# Patient Record
(demographics unavailable — no encounter records)

---

## 2024-10-10 NOTE — DISCUSSION/SUMMARY
[de-identified] : General Dx Discussion The patient was advised of the diagnosis. The natural history of the pathology was explained in full to the patient in layman's terms. All questions were answered. The risks and benefits of surgical and non-surgical treatment alternatives were explained in full to the patient.  Case discussed. Orthovisc tolerated well. Ice as needed. Follow up in 1 week to continue the series.   Entered by EVELIN Horton acting as scribe. - The documentation recorded by the scribe accurately reflects the service I personally performed and the decisions made by me.

## 2024-10-10 NOTE — HISTORY OF PRESENT ILLNESS
[3] : 3 [Orthovisc] : Orthovisc [de-identified] : Patient is here for a Orthrovisc injection #3 for NANDO knees [] : no [de-identified] : Víctor Knees

## 2024-10-10 NOTE — PROCEDURE
[Large Joint Injection] : Large joint injection [Bilateral] : bilaterally of the [Knee] : knee [Pain] : pain [X-ray evidence of Osteoarthritis on this or prior visit] : x-ray evidence of Osteoarthritis on this or prior visit [Alcohol] : alcohol [Betadine] : betadine [Ethyl Chloride sprayed topically] : ethyl chloride sprayed topically [Sterile technique used] : sterile technique used [Orthovisc (30mg)] : 30mg of Orthovisc [#3] : series #3 [] : Patient tolerated procedure well [Call if redness, pain or fever occur] : call if redness, pain or fever occur [Apply ice for 15min out of every hour for the next 12-24 hours as tolerated] : apply ice for 15 minutes out of every hour for the next 12-24 hours as tolerated [Previous OTC use and PT nontherapeutic] : patient has tried OTC's including aspirin, Ibuprofen, Aleve, etc or prescription NSAIDS, and/or exercises at home and/or physical therapy without satisfactory response [Patient had decreased mobility in the joint] : patient had decreased mobility in the joint [Risks, benefits, alternatives discussed / Verbal consent obtained] : the risks benefits, and alternatives have been discussed, and verbal consent was obtained [Prior failure or difficult injection] : prior failure or difficult injection [Altered anatomic landmarks d/t erosive arthritis] : altered anatomic landmarks d/t erosive arthritis [All ultrasound images have been permanently captured and stored accordingly in our picture archiving and communication system] : All ultrasound images have been permanently captured and stored accordingly in our picture archiving and communication system [Visualization of the needle and placement of injection was performed without complication] : visualization of the needle and placement of injection was performed without complication

## 2024-10-10 NOTE — PHYSICAL EXAM
[Bilateral] : knee bilaterally [Negative] : negative Nancy's [] : uses walker [TWNoteComboBox7] : flexion 105 degrees [de-identified] : extension 0 degrees

## 2024-10-10 NOTE — DISCUSSION/SUMMARY
[de-identified] : General Dx Discussion The patient was advised of the diagnosis. The natural history of the pathology was explained in full to the patient in layman's terms. All questions were answered. The risks and benefits of surgical and non-surgical treatment alternatives were explained in full to the patient.  Case discussed. Orthovisc tolerated well. Ice as needed. Follow up in 1 week to continue the series.   Entered by EVELIN Horton acting as scribe. - The documentation recorded by the scribe accurately reflects the service I personally performed and the decisions made by me.

## 2024-10-10 NOTE — HISTORY OF PRESENT ILLNESS
[3] : 3 [Orthovisc] : Orthovisc [de-identified] : Patient is here for a Orthrovisc injection #3 for NANDO knees [] : no [de-identified] : Víctor Knees

## 2024-10-10 NOTE — PHYSICAL EXAM
[Bilateral] : knee bilaterally [Negative] : negative Nancy's [] : uses walker [TWNoteComboBox7] : flexion 105 degrees [de-identified] : extension 0 degrees

## 2024-10-18 NOTE — DISCUSSION/SUMMARY
[de-identified] : General Dx Discussion The patient was advised of the diagnosis. The natural history of the pathology was explained in full to the patient in layman's terms. All questions were answered. The risks and benefits of surgical and non-surgical treatment alternatives were explained in full to the patient.  Case discussed. Orthovisc tolerated well. Ice as needed. Follow up in 6 weeks if symptoms persist.   Entered by EVELIN Perkins acting as scribe. - The documentation recorded by the scribe accurately reflects the service I personally performed and the decisions made by me.

## 2024-10-18 NOTE — HISTORY OF PRESENT ILLNESS
[4] : 4 [Orthovisc] : Orthovisc [de-identified] : Patient is here for a Orthrovisc injection #4 for NANDO knees [] : no [de-identified] : Víctor Knees [TWNoteComboBox1] : 30%

## 2024-10-18 NOTE — PROCEDURE
[Large Joint Injection] : Large joint injection [Bilateral] : bilaterally of the [Knee] : knee [Pain] : pain [X-ray evidence of Osteoarthritis on this or prior visit] : x-ray evidence of Osteoarthritis on this or prior visit [Alcohol] : alcohol [Betadine] : betadine [Ethyl Chloride sprayed topically] : ethyl chloride sprayed topically [Sterile technique used] : sterile technique used [Orthovisc (30mg)] : 30mg of Orthovisc [#4] : series #4 [] : Patient tolerated procedure well [Call if redness, pain or fever occur] : call if redness, pain or fever occur [Apply ice for 15min out of every hour for the next 12-24 hours as tolerated] : apply ice for 15 minutes out of every hour for the next 12-24 hours as tolerated [Previous OTC use and PT nontherapeutic] : patient has tried OTC's including aspirin, Ibuprofen, Aleve, etc or prescription NSAIDS, and/or exercises at home and/or physical therapy without satisfactory response [Patient had decreased mobility in the joint] : patient had decreased mobility in the joint [Risks, benefits, alternatives discussed / Verbal consent obtained] : the risks benefits, and alternatives have been discussed, and verbal consent was obtained [Prior failure or difficult injection] : prior failure or difficult injection [Altered anatomic landmarks d/t erosive arthritis] : altered anatomic landmarks d/t erosive arthritis [All ultrasound images have been permanently captured and stored accordingly in our picture archiving and communication system] : All ultrasound images have been permanently captured and stored accordingly in our picture archiving and communication system [Visualization of the needle and placement of injection was performed without complication] : visualization of the needle and placement of injection was performed without complication

## 2024-10-18 NOTE — PHYSICAL EXAM
[Bilateral] : knee bilaterally [Negative] : negative Nancy's [] : uses walker [TWNoteComboBox7] : flexion 105 degrees [de-identified] : extension 0 degrees

## 2024-10-25 NOTE — PROCEDURE
[FreeTextEntry3] : Tendon sheath injection r4 a1 was performed. The indication for this procedure was pain and inflammation. The site was prepped with alcohol, betadine, ethyl chloride sprayed topically and sterile technique used. An injection of Betamethasone (Celestone) 1cc of 3mg, Lidocaine 1cc of 1%  was used.  Patient tolerated procedure well.   The risks benefits, and alternatives have been discussed, and verbal consent was obtained.  Ultrasound guidance was indicated for this patient due to prior failure or difficult injection. All ultrasound images have been permanently captured and stored accordingly in our picture archiving and communication system. Visualization of the needle and placement of injection was performed without complication.

## 2024-10-25 NOTE — HISTORY OF PRESENT ILLNESS
[Rest] : rest [Gradual] : gradual [7] : 7 [5] : 5 [Dull/Aching] : dull/aching [Intermittent] : intermittent [Meds] : meds [] : no [FreeTextEntry1] : RT Hand/Wrist  [FreeTextEntry5] : Patient has history of trigger finger and is now starting to have pain throughout hand into wrist.  10/25 pain and clicking r4 [FreeTextEntry7] : shoulder  [FreeTextEntry9] : advil and brace  [de-identified] : moving hand

## 2024-10-25 NOTE — PHYSICAL EXAM
[4th] : 4th [A1-Pulley] : A1-pulley [Right] : right hand [Degenerative change] : Degenerative change

## 2025-04-24 NOTE — HISTORY OF PRESENT ILLNESS
[4] : 4 [Orthovisc] : Orthovisc [] : yes [8] : 8 [Dull/Aching] : dull/aching [Sharp] : sharp [Intermittent] : intermittent [Walking] : walking

## 2025-05-08 NOTE — PROCEDURE
[Large Joint Injection] : Large joint injection [Bilateral] : bilaterally of the [Knee] : knee [Pain] : pain [Inflammation] : inflammation [X-ray evidence of Osteoarthritis on this or prior visit] : x-ray evidence of Osteoarthritis on this or prior visit [Repeat series performed] : repeat series performed [Alcohol] : alcohol [Betadine] : betadine [Ethyl Chloride sprayed topically] : ethyl chloride sprayed topically [Sterile technique used] : sterile technique used [Orthovisc (30mg)] : 30mg of Orthovisc [#2] : series #2 [] : Patient tolerated procedure well [Call if redness, pain or fever occur] : call if redness, pain or fever occur [Apply ice for 15min out of every hour for the next 12-24 hours as tolerated] : apply ice for 15 minutes out of every hour for the next 12-24 hours as tolerated [Previous OTC use and PT nontherapeutic] : patient has tried OTC's including aspirin, Ibuprofen, Aleve, etc or prescription NSAIDS, and/or exercises at home and/or physical therapy without satisfactory response [Patient had decreased mobility in the joint] : patient had decreased mobility in the joint [Risks, benefits, alternatives discussed / Verbal consent obtained] : the risks benefits, and alternatives have been discussed, and verbal consent was obtained [Prior failure or difficult injection] : prior failure or difficult injection [Altered anatomic landmarks d/t erosive arthritis] : altered anatomic landmarks d/t erosive arthritis [All ultrasound images have been permanently captured and stored accordingly in our picture archiving and communication system] : All ultrasound images have been permanently captured and stored accordingly in our picture archiving and communication system

## 2025-05-09 NOTE — PHYSICAL EXAM
[Bilateral] : knee bilaterally [Negative] : negative Nancy's [] : no pain with varus stress [TWNoteComboBox7] : flexion 105 degrees [de-identified] : extension 0 degrees

## 2025-05-09 NOTE — DISCUSSION/SUMMARY
[de-identified] : General Dx Discussion The patient was advised of the diagnosis. The natural history of the pathology was explained in full to the patient in layman's terms. All questions were answered. The risks and benefits of surgical and non-surgical treatment alternatives were explained in full to the patient.  Case discussed. Orthovisc tolerated well. Ice as needed. Will start lidoderm patches as needed. Follow up in 1 week to continue the series.    Entered by EVELIN Polo acting as scribe. - The documentation recorded by the scribe accurately reflects the service I personally performed and the decisions made by me.

## 2025-05-09 NOTE — HISTORY OF PRESENT ILLNESS
[8] : 8 [Dull/Aching] : dull/aching [Sharp] : sharp [Intermittent] : intermittent [Walking] : walking [2] : 2 [Orthovisc] : Orthovisc [de-identified] : 05/08/2025: Patient is here for  Knee Orthovisc #2. [] : no [FreeTextEntry1] : BL knees [de-identified] : Víctor Knees [TWNoteComboBox1] : 30%

## 2025-05-15 NOTE — PROCEDURE
[Large Joint Injection] : Large joint injection [Bilateral] : bilaterally of the [Knee] : knee [Pain] : pain [Inflammation] : inflammation [X-ray evidence of Osteoarthritis on this or prior visit] : x-ray evidence of Osteoarthritis on this or prior visit [Repeat series performed] : repeat series performed [Alcohol] : alcohol [Betadine] : betadine [Ethyl Chloride sprayed topically] : ethyl chloride sprayed topically [Sterile technique used] : sterile technique used [Orthovisc (30mg)] : 30mg of Orthovisc [] : Patient tolerated procedure well [Call if redness, pain or fever occur] : call if redness, pain or fever occur [Apply ice for 15min out of every hour for the next 12-24 hours as tolerated] : apply ice for 15 minutes out of every hour for the next 12-24 hours as tolerated [Previous OTC use and PT nontherapeutic] : patient has tried OTC's including aspirin, Ibuprofen, Aleve, etc or prescription NSAIDS, and/or exercises at home and/or physical therapy without satisfactory response [Patient had decreased mobility in the joint] : patient had decreased mobility in the joint [Risks, benefits, alternatives discussed / Verbal consent obtained] : the risks benefits, and alternatives have been discussed, and verbal consent was obtained [Prior failure or difficult injection] : prior failure or difficult injection [Altered anatomic landmarks d/t erosive arthritis] : altered anatomic landmarks d/t erosive arthritis [All ultrasound images have been permanently captured and stored accordingly in our picture archiving and communication system] : All ultrasound images have been permanently captured and stored accordingly in our picture archiving and communication system [#3] : series #3

## 2025-05-15 NOTE — HISTORY OF PRESENT ILLNESS
----- Message from Mario Logan MD sent at 3/2/2020 12:33 PM CST -----  Please call Mom, CBC is normal. Will have them call us if not better in a few weeks   [8] : 8 [Dull/Aching] : dull/aching [Sharp] : sharp [Intermittent] : intermittent [Walking] : walking [3] : 3 [Orthovisc] : Orthovisc [] : yes

## 2025-05-22 NOTE — PROCEDURE
[Large Joint Injection] : Large joint injection [Bilateral] : bilaterally of the [Knee] : knee [Pain] : pain [Inflammation] : inflammation [X-ray evidence of Osteoarthritis on this or prior visit] : x-ray evidence of Osteoarthritis on this or prior visit [Repeat series performed] : repeat series performed [Alcohol] : alcohol [Betadine] : betadine [Ethyl Chloride sprayed topically] : ethyl chloride sprayed topically [Sterile technique used] : sterile technique used [Orthovisc (30mg)] : 30mg of Orthovisc [#4] : series #4 [] : Patient tolerated procedure well [Call if redness, pain or fever occur] : call if redness, pain or fever occur [Apply ice for 15min out of every hour for the next 12-24 hours as tolerated] : apply ice for 15 minutes out of every hour for the next 12-24 hours as tolerated [Previous OTC use and PT nontherapeutic] : patient has tried OTC's including aspirin, Ibuprofen, Aleve, etc or prescription NSAIDS, and/or exercises at home and/or physical therapy without satisfactory response [Patient had decreased mobility in the joint] : patient had decreased mobility in the joint [Risks, benefits, alternatives discussed / Verbal consent obtained] : the risks benefits, and alternatives have been discussed, and verbal consent was obtained [Prior failure or difficult injection] : prior failure or difficult injection [Altered anatomic landmarks d/t erosive arthritis] : altered anatomic landmarks d/t erosive arthritis [All ultrasound images have been permanently captured and stored accordingly in our picture archiving and communication system] : All ultrasound images have been permanently captured and stored accordingly in our picture archiving and communication system

## 2025-05-22 NOTE — PHYSICAL EXAM
[Bilateral] : knee bilaterally [Negative] : negative Nancy's [] : no pain with varus stress [TWNoteComboBox7] : flexion 105 degrees [de-identified] : extension 0 degrees

## 2025-05-22 NOTE — HISTORY OF PRESENT ILLNESS
[8] : 8 [Dull/Aching] : dull/aching [Sharp] : sharp [Intermittent] : intermittent [Walking] : walking [4] : 4 [Orthovisc] : Orthovisc [de-identified] : Patient is here for a follow up on both knees. [] : no [FreeTextEntry1] : BL knees [de-identified] : 5/15/25 [de-identified] : Víctor Knees [TWNoteComboBox1] : 30%

## 2025-05-22 NOTE — DISCUSSION/SUMMARY
[de-identified] : General Dx Discussion The patient was advised of the diagnosis. The natural history of the pathology was explained in full to the patient in layman's terms. All questions were answered. The risks and benefits of surgical and non-surgical treatment alternatives were explained in full to the patient.  Case Discussed. Orthovisc tolerated well. Ice as needed. Follow up as needed.    Entered by EVELIN Polo acting as scribe. - The documentation recorded by the scribe accurately reflects the service I personally performed and the decisions made by me.

## 2025-07-03 NOTE — PROCEDURE
[FreeTextEntry3] : Date of Service: 07/03/2025   Account: [id number]   Patient: ANGELA LEONARDO   YOB: 1939   Age: 85 year     Surgeon: Cari Orr M.D.   Pre-Operative Diagnosis: Right Knee pain   Post Operative Diagnosis: Right Knee pain   Procedure: Right Genicular nerve block under fluoroscopic guidance   Anesthesia: None     Risks, benefits and alternatives of the procedure were discussed with the patient after which they agreed to proceed. Patient was brought into fluoroscopy suite and was placed in supine position, the Right knee was then placed on pillows as tolerated to offset a clear lateral view. The knee was prepped and draped in a sterile fashion.   Under AP visualization, the superior medial and lateral epicondyle of the femur as well as the distal aspect of the medial tibial epicondyle was identified. Using a 25 gauge  inch needle the skin and subcutaneous structures at this point were localized with 1% Lidocaine using approximately 3 cc's 1% Lidocaine. After this, a 25 gauge needle was inserted under constant fluoroscopic visualization. The needles were advanced using fluoroscopic guidance until reaching the mid level of the femur and tibia confirmed with a lateral view. A mixture of 1cc of 0.25% marcaine and depoomedrol was injected at all 3 sites.   Pillsbury were removed and pressure was then applied. Band-aids were applied. Patient was brought to the recovery, ambulated on their own after the procedure.   Patient was told to apply ice to the knee for 20 minutes on and 20 minutes off for focal symptoms for 24-48 hours. Patient is to call the office if they had any questions or concerns.   Cari Orr M.D.

## 2025-07-12 NOTE — REASON FOR VISIT
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  She was able to sleep.  There is a history of gastritis, so she hasn't taken any NSAIDs.  No n/t.

## 2025-07-12 NOTE — ASSESSMENT
[FreeTextEntry1] : _____ We reviewed the findings and her history. This is an acute, uncomplicated problem. A thumb spica brace was fitted. There are GI issues, so Arthrotec 50mg 1 po q12h PRN x 7 days planned, with GI precautions. She will see Dr. Guido for revaluation.  Patient seen by Kimmy BARRON who determined the assessment and plan and participated in all aspects of the office encounter.

## 2025-07-12 NOTE — HISTORY OF PRESENT ILLNESS
[8] : 8 [Sharp] : sharp [Intermittent] : intermittent [Retired] : Work status: retired [] : Post Surgical Visit: no [FreeTextEntry1] : R wrist [FreeTextEntry5] : Patient reports sudden onset of pain in her right wrist since last night, no specific injury, states she has h/o tendonitis in the wrist before. Pt is RHD. [de-identified] : movement

## 2025-07-12 NOTE — PHYSICAL EXAM
[Right] : right hand [First Dorsal Compartment] : first dorsal compartment [] : palpable radial pulse

## 2025-07-17 NOTE — PHYSICAL EXAM
[Right] : right knee [5___] : hamstring 5[unfilled]/5 [] : uses walker [FreeTextEntry9] : pain worse with flexion

## 2025-07-17 NOTE — ASSESSMENT
[FreeTextEntry1] : After discussing various treatment options with the patient including but not limited to oral medications, physical therapy, exercise, modalities as well as interventional spinal injections, we have decided with the following plan:  1) The risks, benefits, contents and alternatives to injection were explained in full to the patient.  Risks outlined include but are not limited to infection, sepsis, bleeding, post-dural puncture headache, nerve damage, temporary increase in pain, syncopal episode, failure to resolve symptoms, allergic reaction, symptom recurrence, and elevation of blood sugar in diabetics. Cortisone may cause immunosuppression.  Patient understands the risks.  All questions were answered.  After discussion of options, patient requested an injection.  Information regarding the injection was given to the patient.  Which medications to stop prior to the injection was explained to the patient as well   right genicular nerve block vs RFA.

## 2025-07-17 NOTE — HISTORY OF PRESENT ILLNESS
[Sharp] : sharp [Shooting] : shooting [Constant] : constant [Household chores] : household chores [Meds] : meds [Injection therapy] : injection therapy [Walking] : walking [Physical therapy] : physical therapy [7] : 7 [] : no [FreeTextEntry1] : RT KNEE [FreeTextEntry9] : ADVIL [de-identified] : DRIVING